# Patient Record
Sex: FEMALE | Race: BLACK OR AFRICAN AMERICAN | NOT HISPANIC OR LATINO | URBAN - METROPOLITAN AREA
[De-identification: names, ages, dates, MRNs, and addresses within clinical notes are randomized per-mention and may not be internally consistent; named-entity substitution may affect disease eponyms.]

---

## 2020-06-12 ENCOUNTER — TELEPHONE ENCOUNTER (OUTPATIENT)
Dept: URBAN - METROPOLITAN AREA CLINIC 98 | Facility: CLINIC | Age: 65
End: 2020-06-12

## 2020-06-28 PROBLEM — 46177005 END STAGE RENAL DISEASE: Status: ACTIVE | Noted: 2020-06-28

## 2020-06-29 ENCOUNTER — OFFICE VISIT (OUTPATIENT)
Dept: URBAN - METROPOLITAN AREA TELEHEALTH 2 | Facility: TELEHEALTH | Age: 65
End: 2020-06-29
Payer: MEDICARE

## 2020-06-29 DIAGNOSIS — K75.4 AUTOIMMUNE HEPATITIS: ICD-10-CM

## 2020-06-29 DIAGNOSIS — R18.8 ASCITES: ICD-10-CM

## 2020-06-29 DIAGNOSIS — K83.1 CHOLESTASIS: ICD-10-CM

## 2020-06-29 DIAGNOSIS — R74.8 ELEVATED LIVER ENZYMES: ICD-10-CM

## 2020-06-29 PROCEDURE — G8420 CALC BMI NORM PARAMETERS: HCPCS | Performed by: INTERNAL MEDICINE

## 2020-06-29 PROCEDURE — G8427 DOCREV CUR MEDS BY ELIG CLIN: HCPCS | Performed by: INTERNAL MEDICINE

## 2020-06-29 PROCEDURE — G9903 PT SCRN TBCO ID AS NON USER: HCPCS | Performed by: INTERNAL MEDICINE

## 2020-06-29 PROCEDURE — 99214 OFFICE O/P EST MOD 30 MIN: CPT | Performed by: INTERNAL MEDICINE

## 2020-06-29 PROCEDURE — 3017F COLORECTAL CA SCREEN DOC REV: CPT | Performed by: INTERNAL MEDICINE

## 2020-06-29 PROCEDURE — 1036F TOBACCO NON-USER: CPT | Performed by: INTERNAL MEDICINE

## 2020-06-29 RX ORDER — URSODIOL 300 MG/1
1 TABLET CAPSULE ORAL TWICE A DAY
Qty: 60 | Refills: 3 | OUTPATIENT

## 2020-06-29 RX ORDER — LINACLOTIDE 145 UG/1
TAKE 1 CAPSULE (145 MCG) BY ORAL ROUTE ONCE DAILY ON AN EMPTY STOMACH AT LEAST 30 MINUTES BEFORE 1ST MEAL OF THE DAY FOR 90 DAYS CAPSULE, GELATIN COATED ORAL 1
Qty: 90 | Refills: 3 | Status: ACTIVE | COMMUNITY
Start: 2019-07-05 | End: 2020-06-29

## 2020-06-29 NOTE — HPI-TODAY'S VISIT:
Here w daughter  still reports abdominal distension.  did have an LVP at [facility] 3-4 weeks ago- removed 2L.  ABd became more distended in last few weeks.  planned for another LVP tomorrow.   no fevers. some abdominal discomfort 2/2 ascites. kortney diet but feels full easily.

## 2020-06-29 NOTE — HPI-OTHER HISTORIES
Labs 5/15 @ [facility] 136 Cr 3 Tbili 1.1 DB 0.7  AST 79 alt 55 IGG 2380 Ferr 358 Hb 10.5 plt 281 inr 1.15

## 2020-07-04 ENCOUNTER — DASHBOARD ENCOUNTERS (OUTPATIENT)
Age: 65
End: 2020-07-04

## 2020-07-04 PROBLEM — 408335007 AUTOIMMUNE HEPATITIS: Status: ACTIVE | Noted: 2020-06-28

## 2020-07-04 PROBLEM — 389026000 ASCITES: Status: ACTIVE | Noted: 2020-06-28

## 2020-07-27 ENCOUNTER — LAB OUTSIDE AN ENCOUNTER (OUTPATIENT)
Dept: URBAN - METROPOLITAN AREA TELEHEALTH 2 | Facility: TELEHEALTH | Age: 65
End: 2020-07-27